# Patient Record
Sex: FEMALE | Race: WHITE | NOT HISPANIC OR LATINO | Employment: FULL TIME | URBAN - METROPOLITAN AREA
[De-identification: names, ages, dates, MRNs, and addresses within clinical notes are randomized per-mention and may not be internally consistent; named-entity substitution may affect disease eponyms.]

---

## 2023-10-01 ENCOUNTER — HOSPITAL ENCOUNTER (EMERGENCY)
Facility: HOSPITAL | Age: 43
Discharge: HOME | End: 2023-10-01
Attending: EMERGENCY MEDICINE
Payer: OTHER GOVERNMENT

## 2023-10-01 VITALS
SYSTOLIC BLOOD PRESSURE: 142 MMHG | HEART RATE: 102 BPM | WEIGHT: 162.92 LBS | HEIGHT: 64 IN | OXYGEN SATURATION: 92 % | TEMPERATURE: 99.7 F | RESPIRATION RATE: 16 BRPM | DIASTOLIC BLOOD PRESSURE: 86 MMHG | BODY MASS INDEX: 27.81 KG/M2

## 2023-10-01 DIAGNOSIS — B02.9 HERPES ZOSTER WITHOUT COMPLICATION: Primary | ICD-10-CM

## 2023-10-01 LAB
FLUAV AG NPH QL: NEGATIVE
FLUBV AG NPH QL IA: NEGATIVE
S PYO AG THROAT QL: NEGATIVE
SARS-COV-2 RNA RESP QL NAA+PROBE: NOT DETECTED

## 2023-10-01 PROCEDURE — 87804 INFLUENZA ASSAY W/OPTIC: CPT | Performed by: EMERGENCY MEDICINE

## 2023-10-01 PROCEDURE — 87635 SARS-COV-2 COVID-19 AMP PRB: CPT | Performed by: EMERGENCY MEDICINE

## 2023-10-01 PROCEDURE — 87081 CULTURE SCREEN ONLY: CPT | Performed by: PHYSICIAN ASSISTANT

## 2023-10-01 PROCEDURE — 87880 STREP A ASSAY W/OPTIC: CPT | Performed by: PHYSICIAN ASSISTANT

## 2023-10-01 PROCEDURE — 99283 EMERGENCY DEPT VISIT LOW MDM: CPT

## 2023-10-01 RX ORDER — IBUPROFEN 600 MG/1
600 TABLET ORAL EVERY 6 HOURS PRN
Qty: 40 TABLET | Refills: 0 | Status: SHIPPED | OUTPATIENT
Start: 2023-10-01 | End: 2023-10-11

## 2023-10-01 RX ORDER — PROPARACAINE HYDROCHLORIDE 5 MG/ML
1 SOLUTION/ DROPS OPHTHALMIC ONCE
Status: COMPLETED | OUTPATIENT
Start: 2023-10-01 | End: 2023-10-01

## 2023-10-01 RX ORDER — GABAPENTIN 300 MG/1
CAPSULE ORAL
Qty: 34 CAPSULE | Refills: 0 | Status: SHIPPED | OUTPATIENT
Start: 2023-10-01 | End: 2023-10-11

## 2023-10-01 RX ORDER — VALACYCLOVIR HYDROCHLORIDE 1 G/1
1000 TABLET, FILM COATED ORAL 3 TIMES DAILY
Qty: 30 TABLET | Refills: 0 | Status: SHIPPED | OUTPATIENT
Start: 2023-10-01 | End: 2023-10-11

## 2023-10-01 RX ADMIN — PROPARACAINE HYDROCHLORIDE 1 DROP: 5 SOLUTION/ DROPS OPHTHALMIC at 16:22

## 2023-10-01 NOTE — DISCHARGE INSTRUCTIONS
Please return with changes in vision, slurred speech, new numbness or weakness, worsening pain, confusion, or any other worsening or concerning symptoms. Avoid contact with babies, pregnant women, or anyone that is severely immunocompromised. If you have any open lesions then you need to have stricter precautions as these can be airborne.

## 2023-10-01 NOTE — ED PROVIDER NOTES
Time: 4:20 PM EDT  Date of encounter:  10/1/2023  Independent Historian/Clinical History and Information was obtained by:   Patient    History is limited by: N/A    Chief Complaint: headache, URI symptoms      History of Present Illness:  Patient is a 43 y.o. year old female who presents to the emergency department for evaluation of URI symptoms, headache, numbness.  Patient reports that she started having URI symptoms 4 days ago.  Symptoms included headache, congestion, sore throat, cough.  Headache was generalized but now more right temporal feels like a severe burning sensation with significant sensitivity to scalp touch.  Extends right parietal but does not go past into the occipital region.  Extends on the right temporal side of her face.  Denies changes in vision.  It feels very similar to when she had shingles in the past.  Has not noticed any lesions yet.    HPI    Patient Care Team  Primary Care Provider: Inder Hodgson PA    Past Medical History:     Allergies   Allergen Reactions    Sulfa Antibiotics Hives     Past Medical History:   Diagnosis Date    Shingles      History reviewed. No pertinent surgical history.  History reviewed. No pertinent family history.    Home Medications:  Prior to Admission medications    Not on File        Social History:   Social History     Tobacco Use    Smoking status: Former     Types: Cigarettes     Quit date: 2023     Years since quittin.0   Substance Use Topics    Alcohol use: Not Currently    Drug use: Never         Review of Systems:  Review of Systems   Constitutional:  Positive for chills and fever.   HENT:  Positive for congestion and sore throat. Negative for trouble swallowing and voice change.    Respiratory:  Positive for cough.    Cardiovascular:  Negative for chest pain.   Gastrointestinal:  Negative for abdominal pain, diarrhea, nausea and vomiting.   Genitourinary:  Negative for dysuria.   Musculoskeletal:  Positive for myalgias.   Skin:  Negative  "for rash.   Neurological:  Positive for headaches.   All other systems reviewed and are negative.     Physical Exam:  /86 (BP Location: Left arm, Patient Position: Lying)   Pulse 102   Temp 99.7 °F (37.6 °C) (Oral)   Resp 16   Ht 162.6 cm (64\")   Wt 73.9 kg (162 lb 14.7 oz)   SpO2 92%   BMI 27.97 kg/m²     Physical Exam  Vitals and nursing note reviewed.   Constitutional:       Appearance: Normal appearance. She is not ill-appearing or toxic-appearing.   HENT:      Head: Normocephalic.      Right Ear: Tympanic membrane, ear canal and external ear normal.      Left Ear: Tympanic membrane, ear canal and external ear normal.      Ears:      Comments: No lesions concerning for Kristi Gonzalez syndrome     Nose: Nose normal.      Mouth/Throat:      Mouth: Mucous membranes are moist.   Eyes:      Conjunctiva/sclera: Conjunctivae normal.      Comments: No dendritic lesions with fluorescein examination   Cardiovascular:      Rate and Rhythm: Normal rate and regular rhythm.   Pulmonary:      Effort: Pulmonary effort is normal.      Breath sounds: Normal breath sounds.   Musculoskeletal:         General: Normal range of motion.      Cervical back: Normal range of motion.   Skin:     General: Skin is warm and dry.      Capillary Refill: Capillary refill takes less than 2 seconds.   Neurological:      General: No focal deficit present.      Mental Status: She is alert and oriented to person, place, and time.      Cranial Nerves: No cranial nerve deficit.      Sensory: Sensory deficit (increased sensation/sensitivity to right side of face) present.      Motor: No weakness.      Coordination: Coordination normal.      Gait: Gait normal.                Procedures:  Procedures      Medical Decision Making:      Comorbidities that affect care:    None    External Notes reviewed:    None      The following orders were placed and all results were independently analyzed by me:  Orders Placed This Encounter   Procedures    " Influenza Antigen, Rapid - Swab, Nasopharynx    COVID-19,CEPHEID/LIBBY,COR/MELIA/PAD/SHIV/MAD IN-HOUSE(OR EMERGENT/ADD-ON),NP SWAB IN TRANSPORT MEDIA 3-4 HR TAT, RT-PCR - Swab, Nasopharynx    Rapid Strep A Screen - Swab, Throat    Beta Strep Culture, Throat - Swab, Throat       Medications Given in the Emergency Department:  Medications   proparacaine (ALCAINE) 0.5 % ophthalmic solution 1 drop (1 drop Right Eye Given 10/1/23 1622)        ED Course:         Labs:    Lab Results (last 24 hours)       ** No results found for the last 24 hours. **             Imaging:    No Radiology Exams Resulted Within Past 24 Hours      Differential Diagnosis and Discussion:    Fever: Based on the complaint of fever, differential diagnosis includes but is not limited to meningitis, pneumonia, pyelonephritis, acute uti,  systemic immune response syndrome, sepsis, viral syndrome, fungal infection, tick born illness and other bacterial infections.  Headache: Differential diagnosis includes but is not limited to migraine, cluster headache, hypertension, tumor, subarachnoid bleeding, pseudotumor cerebri, temporal arteritis, infections, tension headache, and TMJ syndrome.    All labs were reviewed and interpreted by me.    MDM           Patient Care Considerations:    CT HEAD: I considered ordering a noncontrast CT of the head, however hx consistent with early shingles      Consultants/Shared Management Plan:    I have discussed the case with supervising physician who states that the patient can be safely discharged with close follow up.    Social Determinants of Health:    Patient is independent, reliable, and has access to care.       Disposition and Care Coordination:    Discharged: The patient is suitable and stable for discharge with no need for consideration of observation or admission.  History and physical exam, consistent with early shingles.  Do not see any ophthalmic or otic involvement.  Rx for antiviral, Motrin and gabapentin  with follow-up with PCP.  I have explained the patient´s condition, diagnoses and treatment plan based on the information available to me at this time. I have answered questions and addressed any concerns. The patient has a good  understanding of the patient´s diagnosis, condition, and treatment plan as can be expected at this point. The vital signs have been stable. The patient´s condition is stable and appropriate for discharge from the emergency department.      The patient will pursue further outpatient evaluation with the primary care physician or other designated or consulting physician as outlined in the discharge instructions. They are agreeable to this plan of care and follow-up instructions have been explained in detail. The patient has received these instructions in written format and have expressed an understanding of the discharge instructions. The patient is aware that any significant change in condition or worsening of symptoms should prompt an immediate return to this or the closest emergency department or call to 911.  I have explained discharge medications and the need for follow up with the patient/caretakers. This was also printed in the discharge instructions. Patient was discharged with the following medications and follow up:      Medication List        New Prescriptions      gabapentin 300 MG capsule  Commonly known as: NEURONTIN  Take 1 capsule by mouth Daily for 1 day, THEN 1 capsule 2 (Two) Times a Day for 1 day, THEN 1 capsule 3 (Three) Times a Day for 1 day, THEN 1 capsule 4 (Four) Times a Day for 7 days.  Start taking on: October 1, 2023     ibuprofen 600 MG tablet  Commonly known as: ADVIL,MOTRIN  Take 1 tablet by mouth Every 6 (Six) Hours As Needed for Mild Pain for up to 10 days.     valACYclovir 1000 MG tablet  Commonly known as: VALTREX  Take 1 tablet by mouth 3 (Three) Times a Day for 10 days.               Where to Get Your Medications        These medications were sent to  [id number] - [place], [place] - [address] AT [facility] - [phone]  - [phone] FX  [address] [place] [address] [place] [address]      Phone: [phone]   gabapentin 300 MG capsule  ibuprofen 600 MG tablet  valACYclovir 1000 MG tablet      Inder Hodgson PA  [address] [facility] [address] [phone]          [facility] EMERGENCY ROOM  [address] [facility] [address] [phone]    If symptoms worsen       Final diagnoses:   Herpes zoster without complication        ED Disposition       ED Disposition   Discharge    Condition   Stable    Comment   --               This medical record created using voice recognition software.             Erma Mace PA-C  10/06/23 7480

## 2023-10-03 LAB — BACTERIA SPEC AEROBE CULT: NORMAL

## 2024-03-14 ENCOUNTER — TRANSCRIBE ORDERS (OUTPATIENT)
Dept: ADMINISTRATIVE | Facility: HOSPITAL | Age: 44
End: 2024-03-14
Payer: OTHER GOVERNMENT

## 2024-03-14 DIAGNOSIS — Z12.31 SCREENING MAMMOGRAM FOR BREAST CANCER: Primary | ICD-10-CM
